# Patient Record
Sex: FEMALE | Race: WHITE | NOT HISPANIC OR LATINO | Employment: FULL TIME | URBAN - METROPOLITAN AREA
[De-identification: names, ages, dates, MRNs, and addresses within clinical notes are randomized per-mention and may not be internally consistent; named-entity substitution may affect disease eponyms.]

---
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
## 2025-05-24 ENCOUNTER — HOSPITAL ENCOUNTER (OUTPATIENT)
Facility: HOSPITAL | Age: 20
Discharge: HOME | End: 2025-05-24
Attending: STUDENT IN AN ORGANIZED HEALTH CARE EDUCATION/TRAINING PROGRAM
Payer: MEDICAID

## 2025-05-24 VITALS
RESPIRATION RATE: 16 BRPM | TEMPERATURE: 97.9 F | BODY MASS INDEX: 20.16 KG/M2 | HEART RATE: 79 BPM | HEIGHT: 65 IN | SYSTOLIC BLOOD PRESSURE: 109 MMHG | WEIGHT: 121 LBS | DIASTOLIC BLOOD PRESSURE: 64 MMHG | OXYGEN SATURATION: 99 %

## 2025-05-24 DIAGNOSIS — B37.9 CANDIDIASIS: Primary | ICD-10-CM

## 2025-05-24 PROCEDURE — G0463 HOSPITAL OUTPT CLINIC VISIT: HCPCS | Performed by: NURSE PRACTITIONER

## 2025-05-24 PROCEDURE — 99213 OFFICE O/P EST LOW 20 MIN: CPT | Performed by: NURSE PRACTITIONER

## 2025-05-24 RX ORDER — FLUCONAZOLE 150 MG/1
150 TABLET ORAL ONCE
Status: COMPLETED | OUTPATIENT
Start: 2025-05-24 | End: 2025-05-24

## 2025-05-24 RX ORDER — CLOTRIMAZOLE AND BETAMETHASONE DIPROPIONATE 10; .64 MG/G; MG/G
1 CREAM TOPICAL 2 TIMES DAILY
Qty: 10 G | Refills: 0 | Status: SHIPPED | OUTPATIENT
Start: 2025-05-24 | End: 2025-05-29

## 2025-05-24 RX ADMIN — FLUCONAZOLE 150 MG: 150 TABLET ORAL at 12:10

## 2025-05-24 NOTE — FSED PROVIDER NOTE
Subjective   History of Present Illness  20 yr old female presents C/O vaginal itching X4 days.  Has a thick whitish discharge starting.  No bleeding.  No STD exposure. Denies fever or chills.  No N/V.  No low back pain.  Has some mild lower ABD cramping, but due to start her period and that is the way it usually feels.  No problems with bowel or bladder.         Review of Systems   Constitutional: Negative.    Respiratory: Negative.     Gastrointestinal: Negative.    Genitourinary:  Positive for vaginal discharge. Negative for decreased urine volume, dyspareunia, dysuria, flank pain, frequency, hematuria, menstrual problem, pelvic pain, urgency, vaginal bleeding and vaginal pain.   Musculoskeletal: Negative.    Neurological: Negative.        History reviewed. No pertinent past medical history.    Allergies   Allergen Reactions    Coconut Itching       History reviewed. No pertinent surgical history.    History reviewed. No pertinent family history.    Social History     Socioeconomic History    Marital status: Single           Objective   Physical Exam  Vitals and nursing note reviewed.   Constitutional:       Appearance: Normal appearance.   Abdominal:      General: Abdomen is protuberant.      Palpations: Abdomen is soft.      Tenderness: There is no right CVA tenderness or left CVA tenderness.   Genitourinary:     Comments: Per patient - thick small amount whitish vag discharge.  Vaginal itching.  Skin:     General: Skin is warm and dry.      Capillary Refill: Capillary refill takes less than 2 seconds.   Neurological:      Mental Status: She is alert.         Procedures           ED Course    Will treat with diflucan and vag cream.  If no improvement or symptoms worsen return to the ER.                                         Medical Decision Making  Problems Addressed:  Candidiasis: complicated acute illness or injury    Risk  Prescription drug management.        Final diagnoses:   Candidiasis       ED  Disposition  ED Disposition       ED Disposition   Discharge    Condition   Stable    Comment   --               PATIENT CONNECTION - [place] [id number]  [phone]    As needed, If symptoms worsen         Medication List        New Prescriptions      clotrimazole-betamethasone 1-0.05 % cream  Commonly known as: LOTRISONE  Apply 1 Application topically to the appropriate area as directed 2 (Two) Times a Day for 5 days.               Where to Get Your Medications        These medications were sent to [place] [id number] [facility] - [place], [place] - [address] [facility] [phone] [facility] [phone]   [address] [address]      Phone: [phone]   clotrimazole-betamethasone 1-0.05 % cream

## 2025-06-05 ENCOUNTER — HOSPITAL ENCOUNTER (OUTPATIENT)
Facility: HOSPITAL | Age: 20
Discharge: HOME | End: 2025-06-05
Attending: EMERGENCY MEDICINE
Payer: MEDICAID

## 2025-06-05 VITALS
BODY MASS INDEX: 20.16 KG/M2 | OXYGEN SATURATION: 99 % | WEIGHT: 121 LBS | HEIGHT: 65 IN | SYSTOLIC BLOOD PRESSURE: 124 MMHG | HEART RATE: 86 BPM | RESPIRATION RATE: 15 BRPM | TEMPERATURE: 98.7 F | DIASTOLIC BLOOD PRESSURE: 79 MMHG

## 2025-06-05 DIAGNOSIS — N89.8 VAGINAL ITCHING: Primary | ICD-10-CM

## 2025-06-05 LAB
B-HCG UR QL: NEGATIVE
BACTERIA UR QL AUTO: ABNORMAL /HPF
BILIRUB UR QL STRIP: NEGATIVE
C TRACH DNA SPEC QL NAA+PROBE: NOT DETECTED
CLARITY UR: ABNORMAL
COLOR UR: YELLOW
GLUCOSE UR STRIP-MCNC: NEGATIVE MG/DL
HGB UR QL STRIP.AUTO: ABNORMAL
HOLD SPECIMEN: NORMAL
HYALINE CASTS UR QL AUTO: ABNORMAL /LPF
KETONES UR QL STRIP: NEGATIVE
LEUKOCYTE ESTERASE UR QL STRIP.AUTO: ABNORMAL
N GONORRHOEA RRNA SPEC QL NAA+PROBE: NOT DETECTED
NITRITE UR QL STRIP: NEGATIVE
PH UR STRIP.AUTO: 5.5 [PH] (ref 5–8)
PROT UR QL STRIP: NEGATIVE
RBC # UR STRIP: ABNORMAL /HPF
REF LAB TEST METHOD: ABNORMAL
SP GR UR STRIP: >=1.03 (ref 1–1.03)
SQUAMOUS #/AREA URNS HPF: ABNORMAL /HPF
UROBILINOGEN UR QL STRIP: ABNORMAL
WBC # UR STRIP: ABNORMAL /HPF

## 2025-06-05 PROCEDURE — 87491 CHLMYD TRACH DNA AMP PROBE: CPT | Performed by: EMERGENCY MEDICINE

## 2025-06-05 PROCEDURE — 87798 DETECT AGENT NOS DNA AMP: CPT | Performed by: PHYSICIAN ASSISTANT

## 2025-06-05 PROCEDURE — 87086 URINE CULTURE/COLONY COUNT: CPT | Performed by: EMERGENCY MEDICINE

## 2025-06-05 PROCEDURE — 99212 OFFICE O/P EST SF 10 MIN: CPT | Performed by: PHYSICIAN ASSISTANT

## 2025-06-05 PROCEDURE — 87591 N.GONORRHOEAE DNA AMP PROB: CPT | Performed by: EMERGENCY MEDICINE

## 2025-06-05 PROCEDURE — 87661 TRICHOMONAS VAGINALIS AMPLIF: CPT | Performed by: PHYSICIAN ASSISTANT

## 2025-06-05 PROCEDURE — 87801 DETECT AGNT MULT DNA AMPLI: CPT | Performed by: PHYSICIAN ASSISTANT

## 2025-06-05 PROCEDURE — G0463 HOSPITAL OUTPT CLINIC VISIT: HCPCS | Performed by: PHYSICIAN ASSISTANT

## 2025-06-05 PROCEDURE — 81001 URINALYSIS AUTO W/SCOPE: CPT | Performed by: EMERGENCY MEDICINE

## 2025-06-05 PROCEDURE — 81025 URINE PREGNANCY TEST: CPT

## 2025-06-05 NOTE — FSED PROVIDER NOTE
Subjective   History of Present Illness    Patient is complaining of vaginal itching and vaginal discharge which started on May 24, she was seen here and was prescribed Diflucan.  No tests were done here.  She completed the Diflucan and the symptoms resolved but the itching started again yesterday.  Denies any painful urination.  Denies any chance of STDs. Denies any chance of pregnancy.       Review of Systems   Constitutional:  Negative for activity change and appetite change.   Eyes:  Negative for pain.   Respiratory:  Negative for shortness of breath.    Gastrointestinal:  Negative for nausea and vomiting.   Genitourinary:         Vaginal itching   Musculoskeletal:  Negative for arthralgias.   Skin:  Negative for color change.   Neurological:  Negative for dizziness.   All other systems reviewed and are negative.      History reviewed. No pertinent past medical history.    Allergies   Allergen Reactions    Coconut Itching       History reviewed. No pertinent surgical history.    History reviewed. No pertinent family history.    Social History     Socioeconomic History    Marital status: Single           Objective   Physical Exam  Vitals and nursing note reviewed.   Constitutional:       Appearance: Normal appearance. She is normal weight.   HENT:      Head: Normocephalic and atraumatic.      Nose: Nose normal.      Mouth/Throat:      Mouth: Mucous membranes are moist.   Eyes:      Pupils: Pupils are equal, round, and reactive to light.   Cardiovascular:      Rate and Rhythm: Normal rate and regular rhythm.      Pulses: Normal pulses.      Heart sounds: Normal heart sounds.   Pulmonary:      Effort: Pulmonary effort is normal.      Breath sounds: Normal breath sounds.   Musculoskeletal:         General: Normal range of motion.      Cervical back: Normal range of motion.      Right lower leg: No edema.      Left lower leg: No edema.   Skin:     General: Skin is warm.   Neurological:      General: No focal deficit  present.      Mental Status: She is alert.   Psychiatric:         Behavior: Behavior is cooperative.         Procedures           ED Course                                           Medical Decision Making  Problems Addressed:  Vaginal itching: complicated acute illness or injury    Amount and/or Complexity of Data Reviewed  Labs: ordered.        Final diagnoses:   Vaginal itching       ED Disposition  ED Disposition       ED Disposition   Discharge    Condition   Stable    Comment   --               PATIENT CONNECTION - [facility] [id number]  [phone]        PATIENT CONNECTION - [place] [id number]  [phone]             Medication List      No changes were made to your prescriptions during this visit.

## 2025-06-05 NOTE — DISCHARGE INSTRUCTIONS
Please expect a call from us in the next 2 to 3 days regarding your vaginal results.  I notified her that I will call her in regards to the results and prescribe you the appropriate medication based on the results.

## 2025-06-07 LAB — BACTERIA SPEC AEROBE CULT: NO GROWTH

## 2025-06-08 LAB
A VAGINAE DNA VAG QL NAA+PROBE: NORMAL SCORE
BVAB2 DNA VAG QL NAA+PROBE: NORMAL SCORE
C ALBICANS DNA VAG QL NAA+PROBE: NEGATIVE
C GLABRATA DNA VAG QL NAA+PROBE: NEGATIVE
MEGA1 DNA VAG QL NAA+PROBE: NORMAL SCORE

## 2025-06-09 LAB — T VAGINALIS RRNA SPEC QL NAA+PROBE: NEGATIVE

## 2025-06-20 ENCOUNTER — HOSPITAL ENCOUNTER (EMERGENCY)
Facility: HOSPITAL | Age: 20
Discharge: HOME | End: 2025-06-21
Attending: STUDENT IN AN ORGANIZED HEALTH CARE EDUCATION/TRAINING PROGRAM
Payer: MEDICAID

## 2025-06-20 ENCOUNTER — APPOINTMENT (OUTPATIENT)
Dept: ULTRASOUND IMAGING | Facility: HOSPITAL | Age: 20
End: 2025-06-20
Payer: MEDICAID

## 2025-06-20 DIAGNOSIS — N83.209 HEMORRHAGIC CYST OF OVARY: Primary | ICD-10-CM

## 2025-06-20 DIAGNOSIS — R10.84 GENERALIZED ABDOMINAL PAIN: ICD-10-CM

## 2025-06-20 LAB
ALBUMIN SERPL-MCNC: 4.4 G/DL (ref 3.5–5.2)
ALBUMIN/GLOB SERPL: 1.4 G/DL
ALP SERPL-CCNC: 45 U/L (ref 39–117)
ALT SERPL W P-5'-P-CCNC: 15 U/L (ref 1–33)
ANION GAP SERPL CALCULATED.3IONS-SCNC: 10.7 MMOL/L (ref 5–15)
AST SERPL-CCNC: 29 U/L (ref 1–32)
B-HCG UR QL: NEGATIVE
BASOPHILS # BLD AUTO: 0.05 10*3/MM3 (ref 0–0.2)
BASOPHILS NFR BLD AUTO: 0.5 % (ref 0–1.5)
BILIRUB SERPL-MCNC: 0.2 MG/DL (ref 0–1.2)
BILIRUB UR QL STRIP: NEGATIVE
BUN SERPL-MCNC: 9.7 MG/DL (ref 6–20)
BUN/CREAT SERPL: 12.9 (ref 7–25)
CALCIUM SPEC-SCNC: 9.5 MG/DL (ref 8.6–10.5)
CHLORIDE SERPL-SCNC: 103 MMOL/L (ref 98–107)
CLARITY UR: CLEAR
CO2 SERPL-SCNC: 20.3 MMOL/L (ref 22–29)
COLOR UR: YELLOW
CREAT SERPL-MCNC: 0.75 MG/DL (ref 0.57–1)
DEPRECATED RDW RBC AUTO: 39.1 FL (ref 37–54)
EGFRCR SERPLBLD CKD-EPI 2021: 117.1 ML/MIN/1.73
EOSINOPHIL # BLD AUTO: 0.12 10*3/MM3 (ref 0–0.4)
EOSINOPHIL NFR BLD AUTO: 1.1 % (ref 0.3–6.2)
ERYTHROCYTE [DISTWIDTH] IN BLOOD BY AUTOMATED COUNT: 12.7 % (ref 12.3–15.4)
GLOBULIN UR ELPH-MCNC: 3.2 GM/DL
GLUCOSE SERPL-MCNC: 151 MG/DL (ref 65–99)
GLUCOSE UR STRIP-MCNC: NEGATIVE MG/DL
HCG INTACT+B SERPL-ACNC: <1 MIU/ML
HCT VFR BLD AUTO: 37 % (ref 34–46.6)
HGB BLD-MCNC: 12.7 G/DL (ref 12–15.9)
HGB UR QL STRIP.AUTO: NEGATIVE
HOLD SPECIMEN: NORMAL
HOLD SPECIMEN: NORMAL
IMM GRANULOCYTES # BLD AUTO: 0.01 10*3/MM3 (ref 0–0.05)
IMM GRANULOCYTES NFR BLD AUTO: 0.1 % (ref 0–0.5)
KETONES UR QL STRIP: NEGATIVE
LEUKOCYTE ESTERASE UR QL STRIP.AUTO: NEGATIVE
LIPASE SERPL-CCNC: 39 U/L (ref 13–60)
LYMPHOCYTES # BLD AUTO: 1.65 10*3/MM3 (ref 0.7–3.1)
LYMPHOCYTES NFR BLD AUTO: 15.7 % (ref 19.6–45.3)
MCH RBC QN AUTO: 29.3 PG (ref 26.6–33)
MCHC RBC AUTO-ENTMCNC: 34.3 G/DL (ref 31.5–35.7)
MCV RBC AUTO: 85.5 FL (ref 79–97)
MONOCYTES # BLD AUTO: 0.69 10*3/MM3 (ref 0.1–0.9)
MONOCYTES NFR BLD AUTO: 6.6 % (ref 5–12)
NEUTROPHILS NFR BLD AUTO: 76 % (ref 42.7–76)
NEUTROPHILS NFR BLD AUTO: 8.01 10*3/MM3 (ref 1.7–7)
NITRITE UR QL STRIP: NEGATIVE
PH UR STRIP.AUTO: 6.5 [PH] (ref 5–8)
PLATELET # BLD AUTO: 201 10*3/MM3 (ref 140–450)
PMV BLD AUTO: 11.9 FL (ref 6–12)
POTASSIUM SERPL-SCNC: 3.4 MMOL/L (ref 3.5–5.2)
PROT SERPL-MCNC: 7.6 G/DL (ref 6–8.5)
PROT UR QL STRIP: NEGATIVE
RBC # BLD AUTO: 4.33 10*6/MM3 (ref 3.77–5.28)
SODIUM SERPL-SCNC: 134 MMOL/L (ref 136–145)
SP GR UR STRIP: 1.02 (ref 1–1.03)
UROBILINOGEN UR QL STRIP: NORMAL
WBC NRBC COR # BLD AUTO: 10.53 10*3/MM3 (ref 3.4–10.8)
WHOLE BLOOD HOLD SPECIMEN: NORMAL

## 2025-06-20 PROCEDURE — 85025 COMPLETE CBC W/AUTO DIFF WBC: CPT | Performed by: STUDENT IN AN ORGANIZED HEALTH CARE EDUCATION/TRAINING PROGRAM

## 2025-06-20 PROCEDURE — 80053 COMPREHEN METABOLIC PANEL: CPT | Performed by: STUDENT IN AN ORGANIZED HEALTH CARE EDUCATION/TRAINING PROGRAM

## 2025-06-20 PROCEDURE — 76856 US EXAM PELVIC COMPLETE: CPT

## 2025-06-20 PROCEDURE — 99284 EMERGENCY DEPT VISIT MOD MDM: CPT

## 2025-06-20 PROCEDURE — 99284 EMERGENCY DEPT VISIT MOD MDM: CPT | Performed by: PHYSICIAN ASSISTANT

## 2025-06-20 PROCEDURE — 87591 N.GONORRHOEAE DNA AMP PROB: CPT | Performed by: PHYSICIAN ASSISTANT

## 2025-06-20 PROCEDURE — 36415 COLL VENOUS BLD VENIPUNCTURE: CPT

## 2025-06-20 PROCEDURE — 81003 URINALYSIS AUTO W/O SCOPE: CPT

## 2025-06-20 PROCEDURE — 81025 URINE PREGNANCY TEST: CPT

## 2025-06-20 PROCEDURE — 84702 CHORIONIC GONADOTROPIN TEST: CPT | Performed by: PHYSICIAN ASSISTANT

## 2025-06-20 PROCEDURE — 87491 CHLMYD TRACH DNA AMP PROBE: CPT | Performed by: PHYSICIAN ASSISTANT

## 2025-06-20 PROCEDURE — 76830 TRANSVAGINAL US NON-OB: CPT

## 2025-06-20 PROCEDURE — 83690 ASSAY OF LIPASE: CPT | Performed by: STUDENT IN AN ORGANIZED HEALTH CARE EDUCATION/TRAINING PROGRAM

## 2025-06-20 PROCEDURE — 93976 VASCULAR STUDY: CPT

## 2025-06-20 RX ORDER — COPPER 313.4 MG/1
1 INTRAUTERINE DEVICE INTRAUTERINE ONCE
COMMUNITY

## 2025-06-20 RX ORDER — HYDROCODONE BITARTRATE AND ACETAMINOPHEN 5; 325 MG/1; MG/1
1 TABLET ORAL ONCE AS NEEDED
Refills: 0 | Status: DISCONTINUED | OUTPATIENT
Start: 2025-06-20 | End: 2025-06-21 | Stop reason: HOSPADM

## 2025-06-20 RX ORDER — SODIUM CHLORIDE 0.9 % (FLUSH) 0.9 %
10 SYRINGE (ML) INJECTION AS NEEDED
Status: DISCONTINUED | OUTPATIENT
Start: 2025-06-20 | End: 2025-06-21 | Stop reason: HOSPADM

## 2025-06-20 RX ADMIN — HYDROCODONE BITARTRATE AND ACETAMINOPHEN 1 TABLET: 5; 325 TABLET ORAL at 22:01

## 2025-06-20 NOTE — Clinical Note
[facility] FS[facility]  [address]  [facility] [address]    Diana Alicia was seen and treated in our emergency department on 6/20/2025.  She may return to work on 06/22/2025.         Thank you for choosing [facility].    Darrick Allen MD

## 2025-06-21 VITALS
TEMPERATURE: 98.3 F | RESPIRATION RATE: 15 BRPM | DIASTOLIC BLOOD PRESSURE: 54 MMHG | BODY MASS INDEX: 19.63 KG/M2 | OXYGEN SATURATION: 99 % | HEART RATE: 70 BPM | WEIGHT: 115 LBS | SYSTOLIC BLOOD PRESSURE: 104 MMHG | HEIGHT: 64 IN

## 2025-06-21 LAB
C TRACH DNA SPEC QL NAA+PROBE: NOT DETECTED
N GONORRHOEA RRNA SPEC QL NAA+PROBE: NOT DETECTED

## 2025-06-21 NOTE — DISCHARGE INSTRUCTIONS
Your were evaluated for abdominal pain.  You have an ovarian cyst.  Your other tests were ok.  Please continue to treat symptoms at home as needed.  You should follow up with your doctor.  Please return to the Emergency Department with any concerning symptoms.  Darrick Allen MD

## 2025-06-21 NOTE — FSED PROVIDER NOTE
Subjective   History of Present Illness  20-year-old female presents to the ER with generalized pelvic pain, more concentrated in the left lower quadrant.  Patient states that started this afternoon.  She felt pretty significant cramping that felt similar to when she had her IUD placed.  States that at times the cramping was so severe that she felt like she was get a pass out.  Her boyfriend is at bedside and said that at 1 point she almost passed out, her lips became pale, but she did not fall or have a syncopal episode.  Patient states the pain is currently concentrated in the left lower pelvis.  States that pain increases when she sits down to urinate or defecate.  She did have a bowel movement today which was normal.  She has had nausea secondary to pain but no vomiting.  Denies any vaginal bleeding or discharge.  She had intercourse this morning which was normal routine without any pain.  Pain started several hours after intercourse.      Review of Systems   Genitourinary:  Positive for pelvic pain. Negative for vaginal bleeding, vaginal discharge and vaginal pain.       History reviewed. No pertinent past medical history.    Allergies   Allergen Reactions    Coconut Itching       History reviewed. No pertinent surgical history.    History reviewed. No pertinent family history.    Social History     Socioeconomic History    Marital status: Single           Objective   Physical Exam  Vitals and nursing note reviewed.   Constitutional:       General: She is in acute distress.      Appearance: Normal appearance.   HENT:      Head: Normocephalic and atraumatic.      Nose: Nose normal.      Mouth/Throat:      Mouth: Mucous membranes are moist.   Eyes:      Conjunctiva/sclera: Conjunctivae normal.      Pupils: Pupils are equal, round, and reactive to light.   Cardiovascular:      Rate and Rhythm: Normal rate and regular rhythm.   Pulmonary:      Effort: Pulmonary effort is normal.      Breath sounds: Normal breath  sounds.   Abdominal:      Palpations: Abdomen is soft.      Tenderness: There is abdominal tenderness (left lower pelvis).   Genitourinary:     Vagina: Normal.      Cervix: Discharge and friability present. No cervical motion tenderness.      Uterus: Normal.       Adnexa: Right adnexa normal.        Left: Tenderness present.    Musculoskeletal:         General: Normal range of motion.      Cervical back: Normal range of motion and neck supple.   Skin:     General: Skin is warm and dry.   Neurological:      General: No focal deficit present.      Mental Status: She is alert and oriented to person, place, and time.   Psychiatric:         Mood and Affect: Mood normal.         Behavior: Behavior normal.         Thought Content: Thought content normal.         Judgment: Judgment normal.         Procedures           ED Course  ED Course as of 06/21/25 1339   Fri Jun 20, 2025 2232 Pelvic exam showed discharge in the cervical vault.  She also had friability to her cervical os.  No cervical motion tenderness.  She is tender in her left adnexa without obvious fullness.  I cannot accurately palpate a mass.    The plan is for the patient to have a pelvic ultrasound to rule out torsion. HCG quant and qual both negative. Also could be related to pelvic infection, with discharge on exam.     Currently comfortable. She was given oral pain medication.  [AK]   2252 Care turned over to Dr. Allen. We discussed presenting symptoms, current labs, pelvic exam findings, medication, and pending US.  [AK]   Sat Jun 21, 2025   0032 I received pt in sign-out at end of shift, pending results and disposition.  US with right hemorrhagic cyst but no torsion.  Nothing else acute.    Work-up otherwise unremarkable.  No other evidence of ectopic pregnancy, sepsis, significant electrolyte abnormality, severe anemia, or other emergent medical condition clinically.  Pt doing ok, appears comfortable, seems stable for home care.  No indication for  hospitalization or further emergent management in this context.  Discussed results and poc for dc home, symptom management, follow-up, return precautions.   [JA]      ED Course User Index  [AK] Geovanna Guzman PA-C  [JA] Darrick Allen MD                                           Medical Decision Making  Problems Addressed:  Generalized abdominal pain: complicated acute illness or injury  Hemorrhagic cyst of ovary: complicated acute illness or injury    Amount and/or Complexity of Data Reviewed  Labs: ordered.  Radiology: ordered.    Risk  Prescription drug management.        Final diagnoses:   Hemorrhagic cyst of ovary   Generalized abdominal pain       ED Disposition  ED Disposition       ED Disposition   Discharge    Condition   Stable    Comment   --               your doctor          [facility]  [address] [facility] [address]    As needed         Medication List      No changes were made to your prescriptions during this visit.